# Patient Record
Sex: MALE | Race: WHITE | NOT HISPANIC OR LATINO | Employment: FULL TIME | URBAN - METROPOLITAN AREA
[De-identification: names, ages, dates, MRNs, and addresses within clinical notes are randomized per-mention and may not be internally consistent; named-entity substitution may affect disease eponyms.]

---

## 2020-02-23 ENCOUNTER — NURSE TRIAGE (OUTPATIENT)
Dept: NURSING | Facility: CLINIC | Age: 20
End: 2020-02-23

## 2020-02-23 NOTE — TELEPHONE ENCOUNTER
"pt calls in with concern of change in mole    Mole on right shoulder     1st noticed today that mole has changed to a \"purplish \"  color and draining a very small amount of blood    No fever  No pain   No other symptoms       Pt asked if he wanted to see a Derm or PMD  Pt says PMD    Transferred to Scheduling     Protocol and care advice reviewed  Caller states understanding of the recommended disposition  Advised to call back if further questions or concerns    Yevgeniy Brenner RN / [facility]          Reason for Disposition    [1] Skin growth or mole AND [2] changes color, or it has more than one color    Additional Information    Negative: [1] Looks infected AND [2] large red area (> 2 in. or 5 cm)    Negative: [1] Fever AND [2] bump is tender to touch    Negative: [1] Fever AND [2] bright red area or streak    Negative: [1] Looks infected (spreading redness, pus) AND [2] no fever    Negative: Looks like a boil, infected sore, or deep ulcer    Negative: Caller can't describe it clearly    Negative: [1] Skin growth or mole AND [2] two sides do not look the same (i.e., asymmetric)    Negative: [1] Skin growth or mole AND [2] border is irregular or blurry    Protocols used: SKIN LESION - MOLES OR GROWTHS-A-AH      "

## 2020-02-24 ENCOUNTER — OFFICE VISIT - HEALTHEAST (OUTPATIENT)
Dept: FAMILY MEDICINE | Facility: CLINIC | Age: 20
End: 2020-02-24

## 2020-02-24 DIAGNOSIS — D22.9 CHANGE IN MOLE: ICD-10-CM

## 2020-02-24 ASSESSMENT — MIFFLIN-ST. JEOR: SCORE: 1771.16

## 2020-02-25 LAB
LAB AP CHARGES (HE HISTORICAL CONVERSION): NORMAL
PATH REPORT.COMMENTS IMP SPEC: NORMAL
PATH REPORT.FINAL DX SPEC: NORMAL
PATH REPORT.GROSS SPEC: NORMAL
PATH REPORT.MICROSCOPIC SPEC OTHER STN: NORMAL
PATH REPORT.RELEVANT HX SPEC: NORMAL
RESULT FLAG (HE HISTORICAL CONVERSION): NORMAL

## 2020-08-04 ENCOUNTER — COMMUNICATION - HEALTHEAST (OUTPATIENT)
Dept: SCHEDULING | Facility: CLINIC | Age: 20
End: 2020-08-04

## 2021-05-23 ENCOUNTER — HEALTH MAINTENANCE LETTER (OUTPATIENT)
Age: 21
End: 2021-05-23

## 2021-06-04 VITALS
DIASTOLIC BLOOD PRESSURE: 74 MMHG | RESPIRATION RATE: 12 BRPM | BODY MASS INDEX: 23.18 KG/M2 | HEIGHT: 71 IN | WEIGHT: 165.6 LBS | SYSTOLIC BLOOD PRESSURE: 120 MMHG | HEART RATE: 52 BPM

## 2021-06-06 NOTE — PROGRESS NOTES
Assessment/ Plan:         1. Change in mole  lidocaine 1%-EPINEPHrine 1:100,000 1 %-1:100,000 injection 1 mL (XYLOCAINE W/EPI)    Surgical Pathology Exam     Evaluation of the mole revealed acute change with patient.  Patient opted to proceed with a shave biopsy to evaluate further.  See procedure note as below.  Patient will sign up for my chart.  I will notify patient of results of biopsy when available.  Discussed aftercare instructions with patient.  Advised to follow-up if symptoms of infection occur.  He is in agreement with this plan.      Subjective:      Isael Frank is a 19 y.o. male who presents for concerns of a mole on right shoulder. The mole has bee present for at least 10 years. He mentions that it started changing in the last 2 days. It is now purple looking, bloody, and larger than normal.   He is wondering if he can get this removed and evaluated further.     He denies any other questions or concerns today.     The following portions of the patient's history were reviewed and updated as appropriate: allergies, current medications, past family history, past medical history, past social history, past surgical history and problem list.    There is no problem list on file for this patient.      No current outpatient medications on file.     History reviewed. No pertinent past medical history.  Past Surgical History:   Procedure Laterality Date     WISDOM TOOTH EXTRACTION       Family History   Problem Relation Age of Onset     No Medical Problems Mother      No Medical Problems Father      Cancer Maternal Grandmother      Heart disease Maternal Grandfather      Dementia Paternal Grandfather      Social History     Tobacco Use     Smoking status: Never Smoker     Smokeless tobacco: Never Used   Substance Use Topics     Alcohol use: Yes     Comment: rare     Drug use: Never       Review of Systems   Pertinent items are noted in HPI.      Objective:      /74 (Patient Site: Left Arm, Patient  "Position: Sitting, Cuff Size: Adult Regular)   Pulse (!) 52   Resp 12   Ht 5' 10.55\" (1.792 m)   Wt 165 lb 9.6 oz (75.1 kg)   BMI 23.39 kg/m      General appearance: alert, appears stated age and cooperative  Head: Normocephalic, without obvious abnormality, atraumatic  Lungs: clear to auscultation bilaterally  Heart: regular rate and rhythm, S1, S2 normal, no murmur, click, rub or gallop  Skin: General evaluation of skin is within normal limits.  Mole on right collarbone approximately 8 mm in diameter with mildly irregular border and purple in color.  Hair is present at mole as well.  Mole is raised.  It is nontender.  Medial aspect of mole does have an area where it is open and had bled.  Neurologic: Grossly normal     Procedure:  Patient consents to mole removal procedure via shave biopsy.  Consent was completed and signed.  Patient was prepped.  Area was cleansed with iodine and alcohol prep wipe.  Sterile gloves were donned 1 cc of lidocaine with epi was instilled to anesthetize the area.  Appropriate anesthetization was achieved.  Pickups were utilized to hold skin at a tented position derma blade was used in a sawing motion to complete a shave biopsy of the mole.  Minimal bleeding was noted with less than 1 cc of blood loss.  Direct pressure was applied with a gauze.  I applied triple antibiotic ointment and used a sterile 2 x 2 gauze and a Band-Aid to cover the wound.    Aftercare was discussed with the patient and keeping the wound clean and applying triple antibiotic for the next couple days and to monitor for signs or symptoms of infection.        Ananya Wright, KELSI  9:25 AM  "

## 2021-06-10 NOTE — TELEPHONE ENCOUNTER
RN Triage:    Was exposed to a friend on 8/1/20 who has since tested positive for Covid-19.  It was not a close contact.  He was helping her move.  She was wearing a mask, but Isael was not.  Did not drive in the car with her.  He has no symptoms.  Home care discussed.  He plans to monitor at home for now.  Will call back should any symptoms arise.    Maite Bhandari RN  [facility]    Reason for Disposition    [1] COVID-19 EXPOSURE (Close Contact) AND [2] within last 14 days BUT [3] NO symptoms    Additional Information    Negative: COVID-19 has been diagnosed by a healthcare provider (HCP)    Negative: COVID-19 lab test positive    Negative: [1] Symptoms of COVID-19 (e.g., cough, fever, SOB, or others) AND [2] lives in an area with community spread    Negative: [1] Symptoms of COVID-19 (e.g., cough, fever, SOB, or others) AND [2] within 14 days of EXPOSURE (close contact) with diagnosed or suspected COVID-19 patient    Negative: [1] Symptoms of COVID-19 (e.g., cough, fever, SOB, or others) AND [2] within 14 days of travel from high-risk area for COVID-19 community spread (identified by CDC)    Negative: [1] Difficulty breathing (shortness of breath) occurs AND [2] onset > 14 days after COVID-19 EXPOSURE (Close Contact) AND [3] no community spread where patient lives    Negative: [1] Dry cough occurs AND [2] onset > 14 days after COVID-19 EXPOSURE AND [3] no community spread where patient lives    Negative: [1] Wet cough (i.e., white-yellow, yellow, green, or clayton colored sputum) AND [2] onset > 14 days after COVID-19 EXPOSURE AND [3] no community spread where patient lives    Negative: [1] Common cold symptoms AND [2] onset > 14 days after COVID-19 EXPOSURE AND [3] no community spread where patient lives    Negative: [1] COVID-19 EXPOSURE (Close Contact) within last 14 days AND [2] needs COVID-19 lab test to return to work AND [3] NO symptoms    Negative: [1] COVID-19 EXPOSURE (Close  Contact) within last 14 days AND [2] 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

## 2021-09-12 ENCOUNTER — HEALTH MAINTENANCE LETTER (OUTPATIENT)
Age: 21
End: 2021-09-12

## 2022-06-19 ENCOUNTER — HEALTH MAINTENANCE LETTER (OUTPATIENT)
Age: 22
End: 2022-06-19

## 2022-11-19 ENCOUNTER — HEALTH MAINTENANCE LETTER (OUTPATIENT)
Age: 22
End: 2022-11-19

## 2023-07-02 ENCOUNTER — HEALTH MAINTENANCE LETTER (OUTPATIENT)
Age: 23
End: 2023-07-02

## 2024-06-27 ENCOUNTER — OFFICE VISIT (OUTPATIENT)
Dept: FAMILY MEDICINE | Facility: CLINIC | Age: 24
End: 2024-06-27
Payer: COMMERCIAL

## 2024-06-27 VITALS
DIASTOLIC BLOOD PRESSURE: 89 MMHG | BODY MASS INDEX: 27.5 KG/M2 | HEART RATE: 71 BPM | WEIGHT: 203 LBS | OXYGEN SATURATION: 99 % | HEIGHT: 72 IN | RESPIRATION RATE: 18 BRPM | TEMPERATURE: 98.2 F | SYSTOLIC BLOOD PRESSURE: 137 MMHG

## 2024-06-27 DIAGNOSIS — J45.990 EXERCISE-INDUCED ASTHMA: ICD-10-CM

## 2024-06-27 DIAGNOSIS — Z11.59 NEED FOR HEPATITIS C SCREENING TEST: ICD-10-CM

## 2024-06-27 DIAGNOSIS — Z11.3 ROUTINE SCREENING FOR STI (SEXUALLY TRANSMITTED INFECTION): ICD-10-CM

## 2024-06-27 DIAGNOSIS — Z00.00 ANNUAL PHYSICAL EXAM: Primary | ICD-10-CM

## 2024-06-27 DIAGNOSIS — Z11.4 SCREENING FOR HIV (HUMAN IMMUNODEFICIENCY VIRUS): ICD-10-CM

## 2024-06-27 DIAGNOSIS — Z13.1 SCREENING FOR DIABETES MELLITUS: ICD-10-CM

## 2024-06-27 DIAGNOSIS — Z23 NEED FOR VACCINATION: ICD-10-CM

## 2024-06-27 DIAGNOSIS — Z87.2 HISTORY OF REMOVAL OF SKIN MOLE: ICD-10-CM

## 2024-06-27 DIAGNOSIS — Z98.890 HISTORY OF REMOVAL OF SKIN MOLE: ICD-10-CM

## 2024-06-27 DIAGNOSIS — M25.562 ACUTE PAIN OF LEFT KNEE: ICD-10-CM

## 2024-06-27 LAB
ALBUMIN SERPL BCG-MCNC: 4.9 G/DL (ref 3.5–5.2)
ALP SERPL-CCNC: 89 U/L (ref 40–150)
ALT SERPL W P-5'-P-CCNC: 28 U/L (ref 0–70)
ANION GAP SERPL CALCULATED.3IONS-SCNC: 8 MMOL/L (ref 7–15)
AST SERPL W P-5'-P-CCNC: 22 U/L (ref 0–45)
BILIRUB SERPL-MCNC: 0.8 MG/DL
BUN SERPL-MCNC: 23.2 MG/DL (ref 6–20)
CALCIUM SERPL-MCNC: 9.7 MG/DL (ref 8.6–10)
CHLORIDE SERPL-SCNC: 104 MMOL/L (ref 98–107)
CREAT SERPL-MCNC: 1.24 MG/DL (ref 0.67–1.17)
DEPRECATED HCO3 PLAS-SCNC: 29 MMOL/L (ref 22–29)
EGFRCR SERPLBLD CKD-EPI 2021: 83 ML/MIN/1.73M2
GLUCOSE SERPL-MCNC: 89 MG/DL (ref 70–99)
HCV AB SERPL QL IA: NONREACTIVE
HIV 1+2 AB+HIV1 P24 AG SERPL QL IA: NONREACTIVE
POTASSIUM SERPL-SCNC: 4.6 MMOL/L (ref 3.4–5.3)
PROT SERPL-MCNC: 7.3 G/DL (ref 6.4–8.3)
SODIUM SERPL-SCNC: 141 MMOL/L (ref 135–145)
T PALLIDUM AB SER QL: NONREACTIVE

## 2024-06-27 PROCEDURE — 90471 IMMUNIZATION ADMIN: CPT

## 2024-06-27 PROCEDURE — 80053 COMPREHEN METABOLIC PANEL: CPT

## 2024-06-27 PROCEDURE — 36415 COLL VENOUS BLD VENIPUNCTURE: CPT

## 2024-06-27 PROCEDURE — 87591 N.GONORRHOEAE DNA AMP PROB: CPT

## 2024-06-27 PROCEDURE — 99385 PREV VISIT NEW AGE 18-39: CPT | Mod: 25

## 2024-06-27 PROCEDURE — 87491 CHLMYD TRACH DNA AMP PROBE: CPT

## 2024-06-27 PROCEDURE — 86780 TREPONEMA PALLIDUM: CPT

## 2024-06-27 PROCEDURE — 90715 TDAP VACCINE 7 YRS/> IM: CPT

## 2024-06-27 PROCEDURE — 90677 PCV20 VACCINE IM: CPT

## 2024-06-27 PROCEDURE — 90472 IMMUNIZATION ADMIN EACH ADD: CPT

## 2024-06-27 PROCEDURE — 86803 HEPATITIS C AB TEST: CPT

## 2024-06-27 PROCEDURE — 87389 HIV-1 AG W/HIV-1&-2 AB AG IA: CPT

## 2024-06-27 RX ORDER — MONTELUKAST SODIUM 10 MG/1
10 TABLET ORAL AT BEDTIME
Qty: 90 TABLET | Refills: 2 | Status: CANCELLED | OUTPATIENT
Start: 2024-06-27

## 2024-06-27 RX ORDER — ALBUTEROL SULFATE 90 UG/1
1-2 AEROSOL, METERED RESPIRATORY (INHALATION) EVERY 4 HOURS PRN
Qty: 18 G | Refills: 1 | Status: SHIPPED | OUTPATIENT
Start: 2024-06-27

## 2024-06-27 SDOH — HEALTH STABILITY: PHYSICAL HEALTH: ON AVERAGE, HOW MANY DAYS PER WEEK DO YOU ENGAGE IN MODERATE TO STRENUOUS EXERCISE (LIKE A BRISK WALK)?: 4 DAYS

## 2024-06-27 SDOH — HEALTH STABILITY: PHYSICAL HEALTH: ON AVERAGE, HOW MANY MINUTES DO YOU ENGAGE IN EXERCISE AT THIS LEVEL?: 30 MIN

## 2024-06-27 ASSESSMENT — SOCIAL DETERMINANTS OF HEALTH (SDOH): HOW OFTEN DO YOU GET TOGETHER WITH FRIENDS OR RELATIVES?: MORE THAN THREE TIMES A WEEK

## 2024-06-27 NOTE — COMMUNITY RESOURCES LIST (ENGLISH)
June 27, 2024           YOUR PERSONALIZED LIST OF SERVICES & PROGRAMS           & SHELTER    Housing      County - [place] - Coordinated Access - Coordinated Entry access point  [address] [place] [address] (Distance: 5.4 miles)  Phone: [phone]  Language: English  Fee: Free      [facility] Access - Emergency housing  [address] [place] [address] (Distance: 5.4 miles)  Language: English  Fee: Free      [facility]  Phone: [phone]  Website: [email or web address]  Language: English, Hmong, Oromo, Micronesian, Ukrainian  Hours: Mon 9:00 AM - 5:00 PM Tue 9:00 AM - 5:00 PM Wed 9:00 AM - 5:00 PM Thu 9:00 AM - 5:00 PM Fri 9:00 AM - 5:00 PM  Fee: Insurance  Accessibility: Blind accommodation, Deaf or hard of hearing, Translation services  Transportation Options: Free transportation    Case Management      [address] [address] (Distance: 5.4 miles)  Phone: [phone]  Language: English  Fee: Free  Accessibility: Translation services, [place] accessible      [employer] - Housing search assistance  [address] [place] [address] (Distance: 5.4 miles)  Phone: [phone]  Language: English, Irish, Tamazight, Hmong  Fee: Free  Accessibility: [place] accessible, Blind accommodation, Deaf or hard of hearing, Translation services      [employer] - Housing Stabilization Services  Phone: [phone]) [phone]  Website: https://homebasemn.com/  Language: English  Hours: Mon 8:00 AM - 4:00 PM Tue 8:00 AM - 4:00 PM Wed 8:00 AM - 4:00 PM Thu 8:00 AM - 4:00 PM Fri 8:00 AM - 4:00 PM  Fee: Free  Accessibility: Blind accommodation, Deaf or hard of hearing  Transportation Options: Free transportation    Drop-In Services      [employer] - [employer]  [address] [place] [address] (Distance: 5.0 miles)  Phone: [phone]  Language: English  Fee:  [facility] Library - Warming or cooling center - [facility] [place] [facility]  [address] [place] [address] (Distance: 4.1 miles)  Language: English  Fee: [facility] POSTAL SERVICE - MAIL SERVICE FOR THE HOMELESS  Phone: [phone]  Website: https://[employer]qii               IMPORTANT NUMBERS & WEBSITES        Emergency Services  911  .   United Way  211 http://211unitedway.org  .   Poison Control  (704) 310-6274 http://[place]poison.org http://w[place]poison.org  .     Suicide and Crisis Lifeline  988 http://988Haversackline.org  .   Childhelp [place] Child Abuse Hotline  457.665.5143 http://Childhelphotline.org   .   [place] Sexual Assault Hotline  (979) 209-3825 (HOPE) http://[employer]   .     [place] Runaway Safeline  (808) 431-9001 (RUNAWAY) http://Beeminder.[facility]  .   Pregnancy & Postpartum Support  Call/text 752-908-7128  [place]: http://ppsupport[place].org  [place]: http://LaboratÃ³rios Noli.com/wi  .   Substance Abuse National Helpline ([facility])  014-558-HELP (6607) http://Findtreatment.gov   .                DISCLAIMER: These resources have been generated via the [employer] Platform. [employer] does not endorse any service providers mentioned in this resource list. [employer] does not guarantee that the services mentioned in this resource list will be available to you or will improve your health or wellness.    [facility]

## 2024-06-27 NOTE — LETTER
My Asthma Action Plan    Name: Isael Frank   YOB: 2000  Date: 6/28/2024   My doctor: GINGER Hensley CNP   My clinic: [facility]        My Rescue Medicine:   Albuterol inhaler (Proair/Ventolin/Proventil HFA)  2-4 puffs EVERY 4 HOURS as needed. AND prior to exercise. Use a spacer if recommended by your provider.   My Asthma Severity:   Intermittent / Exercise Induced               GREEN ZONE   Good Control  I feel good  No cough or wheeze  Can work, sleep and play without asthma symptoms       Take your asthma control medicine every day.     If exercise triggers your asthma, take your rescue medication  15 minutes before exercise or sports, and  During exercise if you have asthma symptoms  Spacer to use with inhaler: If you have a spacer, make sure to use it with your inhaler             YELLOW ZONE Getting Worse  I have ANY of these:  I do not feel good  Cough or wheeze  Chest feels tight  Wake up at night   Keep taking your Green Zone medications  Start taking your rescue medicine:  every 20 minutes for up to 1 hour. Then every 4 hours for 24-48 hours.  If you stay in the Yellow Zone for more than 12-24 hours, contact your doctor.  If you do not return to the Green Zone in 12-24 hours or you get worse, start taking your oral steroid medicine if prescribed by your provider.           RED ZONE Medical Alert - Get Help  I have ANY of these:  I feel awful  Medicine is not helping  Breathing getting harder  Trouble walking or talking  Nose opens wide to breathe       Take your rescue medicine NOW  If your provider has prescribed an oral steroid medicine, start taking it NOW  Call your doctor NOW  If you are still in the Red Zone after 20 minutes and you have not reached your doctor:  Take your rescue medicine again and  Call 911 or go to the emergency room right away    See your regular doctor within 2 weeks of an Emergency Room or Urgent Care visit for follow-up treatment.           Annual Reminders:  Meet with Asthma Educator,  Flu Shot in the Fall, consider Pneumonia Vaccination for patients with asthma (aged 19 and older).    Pharmacy:    [facility] - [place], [place] - [address]  [facility] [place] - [place], [place] - [address]  [facility] [address] [address] [employer] - [place], [place] - [id number] - [place], [place] - [address] [facility]    Electronically signed by GINGER Hensley CNP   Date: 06/28/24                    Asthma Triggers  How To Control Things That Make Your Asthma Worse    Triggers are things that make your asthma worse.  Look at the list below to help you find your triggers and   what you can do about them. You can help prevent asthma flare-ups by staying away from your triggers.      Trigger                                                          What you can do   Cigarette Smoke  Tobacco smoke can make asthma worse. Do not allow smoking in your home, car or around you.  Be sure no one smokes at a child s day care or school.  If you smoke, ask your health care provider for ways to help you quit.  Ask family members to quit too.  Ask your health care provider for a referral to Quit Plan to help you quit smoking, or call 6-403-915-PLAN.     Colds, Flu, Bronchitis  These are common triggers of asthma. Wash your hands often.  Don t touch your eyes, nose or mouth.  Get a flu shot every year.     Dust Mites  These are tiny bugs that live in cloth or carpet. They are too small to see. Wash sheets and blankets in hot water every week.   Encase pillows and mattress in dust mite proof covers.  Avoid having carpet if you can. If you have carpet, vacuum weekly.   Use a dust mask and HEPA vacuum.   Pollen and Outdoor Mold  Some people are allergic to trees, grass, or weed pollen, or molds. Try to keep your windows closed.  Limit time out doors when pollen count is high.   Ask you health care provider about  taking medicine during allergy season.     Animal Dander  Some people are allergic to skin flakes, urine or saliva from pets with fur or feathers. Keep pets with fur or feathers out of your home.    If you can t keep the pet outdoors, then keep the pet out of your bedroom.  Keep the bedroom door closed.  Keep pets off cloth furniture and away from stuffed toys.     Mice, Rats, and Cockroaches  Some people are allergic to the waste from these pests.   Cover food and garbage.  Clean up spills and food crumbs.  Store grease in the refrigerator.   Keep food out of the bedroom.   Indoor Mold  This can be a trigger if your home has high moisture. Fix leaking faucets, pipes, or other sources of water.   Clean moldy surfaces.  Dehumidify basement if it is damp and smelly.   Smoke, Strong Odors, and Sprays  These can reduce air quality. Stay away from strong odors and sprays, such as perfume, powder, hair spray, paints, smoke incense, paint, cleaning products, candles and new carpet.   Exercise or Sports  Some people with asthma have this trigger. Be active!  Ask your doctor about taking medicine before sports or exercise to prevent symptoms.    Warm up for 5-10 minutes before and after sports or exercise.     Other Triggers of Asthma  Cold air:  Cover your nose and mouth with a scarf.  Sometimes laughing or crying can be a trigger.  Some medicines and food can trigger asthma.

## 2024-06-27 NOTE — PROGRESS NOTES
"Preventive Care Visit  [facility]IF[facility] Hospital[facility]INGER Tai CNP, Family Medicine  Jun 27, 2024      Assessment & Plan     Annual physical exam    Acute pain of left knee  - Reduce weekly running mileage as discussed, ensure shoes do not become worn out, collaborate with running / for training plan  - If not effective would consider sports med/PT ref    Exercise-induced asthma  Well controlled  - albuterol (PROAIR HFA) 108 (90 Base) MCG/ACT inhaler  Dispense: 18 g; Refill: 1    Screening for HIV (human immunodeficiency virus)  - HIV Antigen Antibody Combo    Need for hepatitis C screening test  - Hepatitis C Screen Reflex to HCV RNA Quant and Genotype    History of removal of skin mole  MANDY initiated     Screening for diabetes mellitus  - Comprehensive metabolic panel    Need for vaccination  - TDAP 10-64Y (ADACEL,BOOSTRIX)  - PNEUMOCOCCAL 20 VALENT CONJUGATE (PREVNAR 20)    Routine screening for STI (sexually transmitted infection)  - Chlamydia trachomatis PCR Urine  - Neisseria gonorrhoeae PCR Urine [DPH0334]  - Treponema Abs w Reflex to RPR and Titer        BMI  Estimated body mass index is 27.65 kg/m  as calculated from the following:    Height as of this encounter: 1.825 m (5' 11.85\").    Weight as of this encounter: 92.1 kg (203 lb).   Elevated BMI reflective of muscle mass and not obesity     Counseling  Appropriate preventive services were discussed with this patient, including applicable screening as appropriate for fall prevention, nutrition, physical activity, Tobacco-use cessation, weight loss and cognition.  Checklist reviewing preventive services available has been given to the patient.  Reviewed patient's diet, addressing concerns and/or questions.   The patient reports drinking more than 3 alcoholic drinks per day and/or more than 7 drhnks per week. The patient was counseled and given information about possible harmful effects of excessive alcohol intake.    Follow up in 1 " year for repeat annual or sooner with concerns     Subjective   Isael is a 23 year old, presenting for the following:  Physical (Annual/Discuss asthma and inhaler /Left knee after running /STD testing )    Pt. States previous year has been largely uneventful. States mental health is doing well. Has adequate support networks in place. No new family hx of CA, early death, or cardiac disease.     Sub patellar anterior knee pain while running- training for 1/2 marathon, increasing weekly mileage. Recently replaced shoes which has helped. No pain with normal activity, denies edema, erythema, weakness.     Asthma well controlled, only uses inhaler when running >2 miles     Did have 3 precancerous moles removed by derm in 2023- mid back, L thigh, L axilla, has completed follow ups for this. Records not available, MANDY initiated. Denies new areas of concern.     Pt would like routine STI screen. No known exposures/symptoms           6/27/2024     9:00 AM   Additional Questions   Roomed by paz        Health Care Directive  Patient does not have a Health Care Directive or Living Will: Discussed advance care planning with patient; however, patient declined at this time.    HPI        6/27/2024   General Health   How would you rate your overall physical health? Excellent   Feel stress (tense, anxious, or unable to sleep) Not at all            6/27/2024   Nutrition   Three or more servings of calcium each day? (!) I DON'T KNOW   Diet: Regular (no restrictions)   How many servings of fruit and vegetables per day? (!) 0-1   How many sweetened beverages each day? 0-1            6/27/2024   Exercise   Days per week of moderate/strenous exercise 4 days   Average minutes spent exercising at this level 30 min            6/27/2024   Social Factors   Frequency of gathering with friends or relatives More than three times a week   Worry food won't last until get money to buy more No   Food not last or not have enough money for food? No    Do you have housing? (Housing is defined as stable permanent housing and does not include staying ouside in a car, in a tent, in an abandoned building, in an overnight shelter, or couch-surfing.) No   Are you worried about losing your housing? No   Lack of transportation? No   Unable to get utilities (heat,electricity)? No   Want help with housing or utility concern? No      (!) HOUSING CONCERN PRESENT      6/27/2024   Dental   Dentist two times every year? Yes            6/27/2024   TB Screening   Were you born outside of the [place]? No            Today's PHQ-2 Score:       6/27/2024     8:33 AM   PHQ-2 ( 2000 Pfizer)   Q1: Little interest or pleasure in doing things 0   Q2: Feeling down, depressed or hopeless 0   PHQ-2 Score 0   Q1: Little interest or pleasure in doing things Not at all   Q2: Feeling down, depressed or hopeless Not at all   PHQ-2 Score 0           6/27/2024   Substance Use   Alcohol more than 3/day or more than 7/wk Yes   How often do you have a drink containing alcohol 2 to 3 times a week   How many alcohol drinks on typical day 7 to 9   How often do you have 5+ drinks at one occasion Weekly   Audit 2/3 Score 6   How often not able to stop drinking once started Never   How often failed to do what normally expected Never   How often needed first drink in am after a heavy drinking session Never   How often feeling of guilt or remorse after drinking Never   How often unable to remember what happened the night before Less than monthly   Have you or someone else been injured because of your drinking No   Has anyone been concerned or suggested you cut down on drinking No   TOTAL SCORE - AUDIT 10   Do you use any other substances recreationally? No        ETOH use reviewed, education provided.     Social History     Tobacco Use    Smoking status: Never    Smokeless tobacco: Never   Vaping Use    Vaping status: Never Used   Substance Use Topics    Alcohol use: Yes     Comment: Alcoholic Drinks/day: rare     "Drug use: Never           6/27/2024   One time HIV Screening   Previous HIV test? No          6/27/2024   STI Screening   New sexual partner(s) since last STI/HIV test? (!) YES             6/27/2024   Contraception/Family Planning   Questions about contraception or family planning No        Review of Systems  Constitutional, HEENT, cardiovascular, pulmonary, gi and gu systems are negative, except as otherwise noted.     Objective    Exam  /89   Pulse 71   Temp 98.2  F (36.8  C) (Temporal)   Resp 18   Ht 1.825 m (5' 11.85\")   Wt 92.1 kg (203 lb)   SpO2 99%   BMI 27.65 kg/m     Estimated body mass index is 27.65 kg/m  as calculated from the following:    Height as of this encounter: 1.825 m (5' 11.85\").    Weight as of this encounter: 92.1 kg (203 lb).    Physical Exam  GENERAL: alert and no distress  NECK: no adenopathy, no asymmetry, masses, or scars  RESP: lungs clear to auscultation - no rales, rhonchi or wheezes  CV: regular rate and rhythm, normal S1 S2, no S3 or S4, no murmur, click or rub, no peripheral edema  ABDOMEN: soft, nontender, no hepatosplenomegaly, no masses and bowel sounds normal  MS: no gross musculoskeletal defects noted, no edema        Signed Electronically by: GINGER Hensley CNP  "

## 2024-06-28 ENCOUNTER — TELEPHONE (OUTPATIENT)
Dept: FAMILY MEDICINE | Facility: CLINIC | Age: 24
End: 2024-06-28
Payer: COMMERCIAL

## 2024-06-28 PROBLEM — J45.990 EXERCISE-INDUCED ASTHMA: Status: ACTIVE | Noted: 2024-06-28

## 2024-06-28 LAB
C TRACH DNA SPEC QL NAA+PROBE: NEGATIVE
N GONORRHOEA DNA SPEC QL NAA+PROBE: NEGATIVE

## 2024-07-01 ENCOUNTER — MYC MEDICAL ADVICE (OUTPATIENT)
Dept: FAMILY MEDICINE | Facility: CLINIC | Age: 24
End: 2024-07-01
Payer: COMMERCIAL